# Patient Record
Sex: FEMALE | Race: WHITE | NOT HISPANIC OR LATINO | Employment: FULL TIME | URBAN - METROPOLITAN AREA
[De-identification: names, ages, dates, MRNs, and addresses within clinical notes are randomized per-mention and may not be internally consistent; named-entity substitution may affect disease eponyms.]

---

## 2023-03-25 LAB — SARS-COV-2 RESULT: NOT DETECTED

## 2023-11-21 DIAGNOSIS — Z30.41 ENCOUNTER FOR SURVEILLANCE OF CONTRACEPTIVE PILLS: ICD-10-CM

## 2024-01-02 DIAGNOSIS — Z30.41 ENCOUNTER FOR SURVEILLANCE OF CONTRACEPTIVE PILLS: Primary | ICD-10-CM

## 2024-01-02 RX ORDER — NORETHINDRONE 0.35 MG/1
1 TABLET ORAL DAILY
COMMUNITY
Start: 2023-11-21 | End: 2024-01-03 | Stop reason: WASHOUT

## 2024-01-02 RX ORDER — NORETHINDRONE 0.35 MG/1
1 TABLET ORAL DAILY
Qty: 28 TABLET | Refills: 2 | Status: CANCELLED | OUTPATIENT
Start: 2024-01-02

## 2024-01-03 DIAGNOSIS — Z30.41 ENCOUNTER FOR SURVEILLANCE OF CONTRACEPTIVE PILLS: ICD-10-CM

## 2024-01-03 RX ORDER — NORETHINDRONE 0.35 MG/1
1 TABLET ORAL DAILY
Qty: 84 TABLET | Refills: 0 | Status: SHIPPED | OUTPATIENT
Start: 2024-01-03

## 2024-01-03 RX ORDER — NORETHINDRONE 0.35 MG/1
1 TABLET ORAL DAILY
Qty: 84 TABLET | Refills: 0 | Status: SHIPPED | OUTPATIENT
Start: 2024-01-03 | End: 2024-01-03 | Stop reason: SDUPTHER

## 2024-04-22 ENCOUNTER — TELEMEDICINE (OUTPATIENT)
Dept: PRIMARY CARE | Facility: CLINIC | Age: 29
End: 2024-04-22
Payer: COMMERCIAL

## 2024-04-22 DIAGNOSIS — J01.90 ACUTE SINUSITIS, RECURRENCE NOT SPECIFIED, UNSPECIFIED LOCATION: Primary | ICD-10-CM

## 2024-04-22 PROCEDURE — 99213 OFFICE O/P EST LOW 20 MIN: CPT | Performed by: STUDENT IN AN ORGANIZED HEALTH CARE EDUCATION/TRAINING PROGRAM

## 2024-04-22 RX ORDER — AMOXICILLIN AND CLAVULANATE POTASSIUM 875; 125 MG/1; MG/1
875 TABLET, FILM COATED ORAL 2 TIMES DAILY
Qty: 20 TABLET | Refills: 0 | Status: SHIPPED | OUTPATIENT
Start: 2024-04-22 | End: 2024-05-02

## 2024-04-22 NOTE — PROGRESS NOTES
Outpatient Visit Note    No chief complaint on file.      With patient's permission, this is a Telemedicine visit with video and audio. The provider and patient participated in this telemedicine encounter.    HPI:  Pily Ely is a 28 y.o. female here with concern for sinus symptoms.    Reports that symptoms have been ongoing for over a week now.  Symptoms initially thought to be allergies, treating with antihistamine, saline nasal spray, Mucinex without improvement.  Symptoms have been progressing over the past several days.  Also associated with some cough around bedtime.  No wheezing or shortness of breath.  No fevers/chills, no associated GI symptoms.      Works as a nurse in the ED, so frequent potential exposures.  Took a home COVID test which was negative.          Current Medications  Current Outpatient Medications   Medication Instructions    norethindrone (MICRONOR) 0.35 mg, oral, Daily        Allergies  Not on File     Past Medical History:   Diagnosis Date    Headache, unspecified 11/16/2022    Headache    Personal history of other diseases of the respiratory system 11/16/2022    History of asthma      Past Surgical History:   Procedure Laterality Date    OTHER SURGICAL HISTORY  11/16/2022    [place] tooth extraction     No family history on file.     Tobacco Use: Low Risk  (10/26/2023)    Received from [facility]    Patient History     Smoking Tobacco Use: Never     Smokeless Tobacco Use: Never     Passive Exposure: Not on file        ROS  All pertinent positive symptoms are included in the history of present illness.  All other systems have been reviewed and are negative and noncontributory to this patient's current ailments.    VITAL SIGNS  There were no vitals filed for this visit.  There were no vitals filed for this visit.   There is no height or weight on file to calculate BMI.   Patient is unable to proivide    PHYSICAL EXAM  GENERAL APPEARANCE:  Alert and oriented x 3, Pleasant and  cooperative, No acute distress.   LUNGS:  No conversational dyspnea or cough during encounter.   PSYCH:  appropriate mood and affect, no difficulty with speech.   Telemedicine visit, no other exam component done.      Assessment/Plan   Problem List Items Addressed This Visit    None  Visit Diagnoses         Codes    Acute sinusitis, recurrence not specified, unspecified location    -  Primary J01.90    Relevant Medications    amoxicillin-pot clavulanate (Augmentin) 875-125 mg tablet            Additional Visit Plans:  Augmentin prescribed today for suspected bacterial sinusitis as symptoms ongoing for over a week per patient report.  No known antibiotic allergies.  Encouraged continued supportive care in addition to antibiotic use including practicing good hydration habits, saline nasal spray and/or Flonase, oral antihistamines, Mucinex.  Patient expressed understanding and was agreeable with this plan.    Counseling:   Medication education:    Education: The patient is counseled regarding potential side effects of all new medications.    Understanding:  Patient expressed understanding.    Adherence:  No barriers to adherence identified.    Shirley Larson MD

## 2024-07-18 ENCOUNTER — APPOINTMENT (OUTPATIENT)
Dept: OBSTETRICS AND GYNECOLOGY | Facility: CLINIC | Age: 29
End: 2024-07-18
Payer: COMMERCIAL

## 2024-07-18 VITALS
BODY MASS INDEX: 32.48 KG/M2 | WEIGHT: 176.5 LBS | DIASTOLIC BLOOD PRESSURE: 87 MMHG | HEIGHT: 62 IN | SYSTOLIC BLOOD PRESSURE: 131 MMHG

## 2024-07-18 DIAGNOSIS — Z01.419 WELL WOMAN EXAM WITH ROUTINE GYNECOLOGICAL EXAM: Primary | ICD-10-CM

## 2024-07-18 DIAGNOSIS — Z30.41 ENCOUNTER FOR SURVEILLANCE OF CONTRACEPTIVE PILLS: ICD-10-CM

## 2024-07-18 DIAGNOSIS — E03.8 SUBCLINICAL HYPOTHYROIDISM: ICD-10-CM

## 2024-07-18 DIAGNOSIS — N92.6 IRREGULAR BLEEDING: ICD-10-CM

## 2024-07-18 DIAGNOSIS — Z11.3 SCREENING EXAMINATION FOR STI: ICD-10-CM

## 2024-07-18 PROCEDURE — 99395 PREV VISIT EST AGE 18-39: CPT

## 2024-07-18 PROCEDURE — 99214 OFFICE O/P EST MOD 30 MIN: CPT

## 2024-07-18 PROCEDURE — 3008F BODY MASS INDEX DOCD: CPT

## 2024-07-18 PROCEDURE — 87591 N.GONORRHOEAE DNA AMP PROB: CPT

## 2024-07-18 PROCEDURE — 87491 CHLMYD TRACH DNA AMP PROBE: CPT

## 2024-07-18 RX ORDER — ESZOPICLONE 3 MG/1
3 TABLET, FILM COATED ORAL NIGHTLY
COMMUNITY
Start: 2024-03-04

## 2024-07-18 RX ORDER — DEXTROAMPHETAMINE SACCHARATE, AMPHETAMINE ASPARTATE, DEXTROAMPHETAMINE SULFATE AND AMPHETAMINE SULFATE 1.25; 1.25; 1.25; 1.25 MG/1; MG/1; MG/1; MG/1
TABLET ORAL
COMMUNITY
Start: 2024-06-26

## 2024-07-18 RX ORDER — NORETHINDRONE 0.35 MG/1
1 TABLET ORAL DAILY
Qty: 84 TABLET | Refills: 3 | Status: SHIPPED | OUTPATIENT
Start: 2024-07-18

## 2024-07-18 RX ORDER — NARATRIPTAN 2.5 MG/1
2.5 TABLET ORAL
COMMUNITY
Start: 2024-06-12

## 2024-07-18 RX ORDER — DEXTROAMPHETAMINE SACCHARATE, AMPHETAMINE ASPARTATE, DEXTROAMPHETAMINE SULFATE AND AMPHETAMINE SULFATE 7.5; 7.5; 7.5; 7.5 MG/1; MG/1; MG/1; MG/1
1 TABLET ORAL
COMMUNITY
Start: 2024-04-04

## 2024-07-18 NOTE — PROGRESS NOTES
"Subjective   Pily Ely is a 28 y.o. female who is here for a routine exam.     GYN & Sexual Hx.:   - Last pap 11/2022, normal.  - History of abnormal Pap smear: yes - Pt. states that she has a hx. of ASCUS, HPV positive pap, followed by normal colposcopy in 2018 per patient; though pap result from 2019 normal.   - Contraception: POPs  - Menstrual Periods: Periods have never been normal per patient; cycles irregular with POPs per patient.     Not currently sexually active though has been in the past.    Saw endocrinology for increased weight gain, increased TSH.   States that endocrine was thinking possibly PCOS for diagnosis?  Elevated testosterone.     HTN newly noted since October per patient.     OB Hx.:   G0    Regular self breast exam: yes  History of breast lump in 2022, normal ultrasound, Cat. 1.  Family history of breast cancer: no    Occupation:  RN in ED and pediatric forensic , Tobacco/alcohol/caffeine: no tobacco use and alcohol intake:social drinker, and Illicit drugs: no history of illicit drug use    Diet: general  Exercise: regularly as directed    Review of Systems   All other systems reviewed and are negative.      Objective   /87   Ht 1.575 m (5' 2\")   Wt 80.1 kg (176 lb 8 oz)   LMP 06/24/2024 (Exact Date)   BMI 32.28 kg/m²      General:   alert and oriented, in no acute distress           Lungs: Normal respiratory effort.   Breasts: Soft, symmetric, no skin dimpling or nipple discharge, no palpable masses or axillary nodes.   Abdomen: soft, non-tender, without masses or organomegaly   Vulva: normal, Bartholin's, Urethra, [place]'s normal   Vagina: normal mucosa, normal discharge   Cervix: no lesions           Neuro: age-appropriate affect, behavior and speech, no gross motor deficits, normal gait     Assessment      28 y.o. G0 woman for annual GYN exam.     - Health Maintenance --> Routine follow up with PCP for health maintenance examination encouraged, including TSH, " cholesterol, and Vit. D evaluation.  Self breast exam encouraged; concerning characteristics of breasts reviewed - Pt. will report general concerns, any adherent lumps, skin dimpling/puckering or color changes, and any nipple discharge.  Diet and exercise reviewed.   Pap due 2025 - Reviewed ACOG and ASCCP guidelines with pt.      - STI screening done today at patient request.     - Contraception Plan -- Continue POPs; Rx renewal provided, patient informed it is essential that the pill be taken at the same time each day to maximize contraceptive efficacy. Patient aware that if she misses a dose by more than three hours, she must use additional contraception (condoms) for 48 hours after the late dose.      - Concern for PCOS // Irregular Menses -- Discussed [place] Criteria; patient does have noted elevated testosterone; states that periods have always been irregular.  Will order for pelvic ultrasound and patient advised to schedule.  Will await result.  Discussed importance of endometrial protection if PCOS is a differential diagnosis; continue POPs, not a candidate for COCs given history of HTN.  Encouraged weight loss, consider nutrition referral.  Referral to endocrinology placed; patient states that she follows with endo at [facility] for her elevated TSH, though interested in possibly switching to .     - F/U 1 year or as needed.    Gina Wiseman, JESI-JAZMYN

## 2024-07-19 LAB
C TRACH RRNA SPEC QL NAA+PROBE: NEGATIVE
N GONORRHOEA DNA SPEC QL PROBE+SIG AMP: NEGATIVE

## 2024-08-05 ENCOUNTER — APPOINTMENT (OUTPATIENT)
Dept: RADIOLOGY | Facility: CLINIC | Age: 29
End: 2024-08-05
Payer: COMMERCIAL

## 2024-08-13 ENCOUNTER — HOSPITAL ENCOUNTER (OUTPATIENT)
Dept: RADIOLOGY | Facility: CLINIC | Age: 29
Discharge: HOME | End: 2024-08-13
Payer: COMMERCIAL

## 2024-08-13 DIAGNOSIS — N92.6 IRREGULAR BLEEDING: ICD-10-CM

## 2024-08-13 PROCEDURE — 76830 TRANSVAGINAL US NON-OB: CPT | Performed by: RADIOLOGY

## 2024-08-13 PROCEDURE — 76856 US EXAM PELVIC COMPLETE: CPT | Performed by: RADIOLOGY

## 2024-08-13 PROCEDURE — 76856 US EXAM PELVIC COMPLETE: CPT

## 2024-09-12 ENCOUNTER — APPOINTMENT (OUTPATIENT)
Dept: PRIMARY CARE | Facility: CLINIC | Age: 29
End: 2024-09-12
Payer: COMMERCIAL

## 2024-09-12 VITALS
OXYGEN SATURATION: 99 % | BODY MASS INDEX: 32.02 KG/M2 | WEIGHT: 174 LBS | TEMPERATURE: 97.1 F | HEIGHT: 62 IN | HEART RATE: 118 BPM | SYSTOLIC BLOOD PRESSURE: 122 MMHG | DIASTOLIC BLOOD PRESSURE: 78 MMHG

## 2024-09-12 DIAGNOSIS — E66.9 OBESITY, CLASS I, BMI 30-34.9: ICD-10-CM

## 2024-09-12 DIAGNOSIS — F90.2 ATTENTION DEFICIT HYPERACTIVITY DISORDER (ADHD), COMBINED TYPE: ICD-10-CM

## 2024-09-12 DIAGNOSIS — E55.9 VITAMIN D DEFICIENCY: ICD-10-CM

## 2024-09-12 DIAGNOSIS — I47.10 SVT (SUPRAVENTRICULAR TACHYCARDIA) (CMS-HCC): ICD-10-CM

## 2024-09-12 DIAGNOSIS — G43.109 MIGRAINE WITH AURA AND WITHOUT STATUS MIGRAINOSUS, NOT INTRACTABLE: ICD-10-CM

## 2024-09-12 DIAGNOSIS — J30.2 SEASONAL ALLERGIES: ICD-10-CM

## 2024-09-12 DIAGNOSIS — Z00.00 ANNUAL PHYSICAL EXAM: Primary | ICD-10-CM

## 2024-09-12 PROBLEM — F51.01 PRIMARY INSOMNIA: Status: ACTIVE | Noted: 2024-09-12

## 2024-09-12 PROBLEM — R87.810 ASCUS WITH POSITIVE HIGH RISK HPV CERVICAL: Status: ACTIVE | Noted: 2024-09-12

## 2024-09-12 PROBLEM — R87.610 ASCUS WITH POSITIVE HIGH RISK HPV CERVICAL: Status: ACTIVE | Noted: 2024-09-12

## 2024-09-12 PROBLEM — E66.811 OBESITY, CLASS I, BMI 30-34.9: Status: ACTIVE | Noted: 2019-09-18

## 2024-09-12 PROCEDURE — 1036F TOBACCO NON-USER: CPT | Performed by: NURSE PRACTITIONER

## 2024-09-12 PROCEDURE — 3008F BODY MASS INDEX DOCD: CPT | Performed by: NURSE PRACTITIONER

## 2024-09-12 PROCEDURE — 99395 PREV VISIT EST AGE 18-39: CPT | Performed by: NURSE PRACTITIONER

## 2024-09-12 RX ORDER — EPINEPHRINE 0.3 MG/.3ML
0.3 INJECTION SUBCUTANEOUS
COMMUNITY
Start: 2023-08-31

## 2024-09-12 ASSESSMENT — PATIENT HEALTH QUESTIONNAIRE - PHQ9
2. FEELING DOWN, DEPRESSED OR HOPELESS: NOT AT ALL
SUM OF ALL RESPONSES TO PHQ9 QUESTIONS 1 AND 2: 0
1. LITTLE INTEREST OR PLEASURE IN DOING THINGS: NOT AT ALL

## 2024-09-12 ASSESSMENT — PAIN SCALES - GENERAL: PAINLEVEL: 0-NO PAIN

## 2024-09-12 ASSESSMENT — ENCOUNTER SYMPTOMS
LOSS OF SENSATION IN FEET: 0
DEPRESSION: 0
OCCASIONAL FEELINGS OF UNSTEADINESS: 0

## 2024-09-12 NOTE — PATIENT INSTRUCTIONS
"Return for Fasting Labs  Nothing to eat or drink for 10 hours. Black coffee, black tea or water is ok    Night Shift Tips  * Take an hour or so to relax after work, whether it is day or nighttime. Relaxing music or a warm bath will help.    * Eat meals at the same time each day seven days a week. This schedule helps maintain the body's clock.   of your shift.    *Avoid drinking alcoholic beverages before bedtime. Although the sedative effect helps you fall asleep, it tends to wear off in 2 - 3 hours and causes disturbed sleep in the latter half of the night.    *Avoid coffee, tea, risa, all other caffeine drinks, which interfere with sleep. On a coffee break, drink orange juice (protein) and walk around. Physical activity promotes wakefulness.    *Avoid going to bed on an empty stomach. If you don't feel like eating much, try a glass of milk or dairy products, which promote sleep.    *Keep the temperature in your bedroom cool, not cold, (64 to 66 degrees)    *Wear sunglasses on your way home from work .Darken bedroom or wear comfortable eyeshades. Eyes are sensitive to light even when the lids are closed, preventing you from falling asleep or getting consolidated sleep.  Block out daytime noises, which can disturb deep restful sleep. Use comfortable sponge ear plugs or \"white noise\" electrical devices such as fans, air conditioners, or a quiet tape.    *Exercise at least every other day AFTER sleep. Daytime sleepers should avoid early morning exercise, which can promote wakefulness during the day.  Beware of certain medication. Avoid prolonged use of sleeping pills and other sedatives, which interfere with normal sleep patterns. Beware of cold and allergy medications which have sleep-related side effects. Pseudoephedrine (Sudafed) has a stimulating effect and antihistamines (Dristan) can cause drowsiness.    Insomnia  Begin a bedtime ritual. This helps to get your brain and body ready to fall asleep and performing " the same tasks let your brain know sleep is coming.   One hour before sleep: Turn off the television, computer, and cell phone. Screens stimulate the brain to awaken and TV's are not recommended in the bedroom.   Dim lights, begin calming activities such as reading, meditating, or taking a warm bath or shower. If you use melatonin or sleep medications take them now.   Listening to white noise such as a fan or white noise machine can help.  Try to go to sleep and wake up at the same times each day in order to establish a routine.  Limit alcohol and stop caffeine several hours before sleep.
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
## 2024-09-12 NOTE — ASSESSMENT & PLAN NOTE
Currently managed by her Neurologist  Medications include Adderall 30 mg in am plus Adderall 5mg every afternoon.

## 2024-09-27 ENCOUNTER — APPOINTMENT (OUTPATIENT)
Dept: PRIMARY CARE | Facility: CLINIC | Age: 29
End: 2024-09-27
Payer: COMMERCIAL

## 2024-09-30 ENCOUNTER — APPOINTMENT (OUTPATIENT)
Dept: PRIMARY CARE | Facility: CLINIC | Age: 29
End: 2024-09-30
Payer: COMMERCIAL

## 2024-10-01 ENCOUNTER — APPOINTMENT (OUTPATIENT)
Dept: PRIMARY CARE | Facility: CLINIC | Age: 29
End: 2024-10-01
Payer: COMMERCIAL

## 2024-10-28 ENCOUNTER — APPOINTMENT (OUTPATIENT)
Dept: PRIMARY CARE | Facility: CLINIC | Age: 29
End: 2024-10-28
Payer: COMMERCIAL

## 2024-10-28 ENCOUNTER — APPOINTMENT (OUTPATIENT)
Dept: LAB | Facility: LAB | Age: 29
End: 2024-10-28
Payer: COMMERCIAL

## 2024-10-28 VITALS
RESPIRATION RATE: 17 BRPM | HEART RATE: 68 BPM | DIASTOLIC BLOOD PRESSURE: 64 MMHG | HEIGHT: 62 IN | SYSTOLIC BLOOD PRESSURE: 118 MMHG | WEIGHT: 174 LBS | OXYGEN SATURATION: 100 % | BODY MASS INDEX: 32.02 KG/M2

## 2024-10-28 DIAGNOSIS — E66.811 OBESITY, CLASS I, BMI 30-34.9: Primary | ICD-10-CM

## 2024-10-28 DIAGNOSIS — N91.5 OLIGOMENORRHEA, UNSPECIFIED TYPE: ICD-10-CM

## 2024-10-28 DIAGNOSIS — F51.01 PRIMARY INSOMNIA: ICD-10-CM

## 2024-10-28 DIAGNOSIS — F90.2 ATTENTION DEFICIT HYPERACTIVITY DISORDER (ADHD), COMBINED TYPE: ICD-10-CM

## 2024-10-28 DIAGNOSIS — G43.109 MIGRAINE WITH AURA AND WITHOUT STATUS MIGRAINOSUS, NOT INTRACTABLE: ICD-10-CM

## 2024-10-28 DIAGNOSIS — I47.10 SVT (SUPRAVENTRICULAR TACHYCARDIA) (CMS-HCC): ICD-10-CM

## 2024-10-28 LAB
25(OH)D3 SERPL-MCNC: 24 NG/ML (ref 30–100)
ALBUMIN SERPL BCP-MCNC: 4.3 G/DL (ref 3.4–5)
ALP SERPL-CCNC: 74 U/L (ref 33–110)
ALT SERPL W P-5'-P-CCNC: 11 U/L (ref 7–45)
ANION GAP SERPL CALC-SCNC: 10 MMOL/L (ref 10–20)
AST SERPL W P-5'-P-CCNC: 14 U/L (ref 9–39)
BILIRUB SERPL-MCNC: 0.9 MG/DL (ref 0–1.2)
BUN SERPL-MCNC: 17 MG/DL (ref 6–23)
CALCIUM SERPL-MCNC: 9.2 MG/DL (ref 8.6–10.3)
CHLORIDE SERPL-SCNC: 105 MMOL/L (ref 98–107)
CHOLEST SERPL-MCNC: 156 MG/DL (ref 0–199)
CHOLESTEROL/HDL RATIO: 3.6
CO2 SERPL-SCNC: 29 MMOL/L (ref 21–32)
CREAT SERPL-MCNC: 0.88 MG/DL (ref 0.5–1.05)
EGFRCR SERPLBLD CKD-EPI 2021: >90 ML/MIN/1.73M*2
ERYTHROCYTE [DISTWIDTH] IN BLOOD BY AUTOMATED COUNT: 12.1 % (ref 11.5–14.5)
GLUCOSE SERPL-MCNC: 104 MG/DL (ref 74–99)
HCT VFR BLD AUTO: 41.9 % (ref 36–46)
HDLC SERPL-MCNC: 43.2 MG/DL
HGB BLD-MCNC: 13.5 G/DL (ref 12–16)
LDLC SERPL CALC-MCNC: 99 MG/DL
MCH RBC QN AUTO: 29.7 PG (ref 26–34)
MCHC RBC AUTO-ENTMCNC: 32.2 G/DL (ref 32–36)
MCV RBC AUTO: 92 FL (ref 80–100)
NON HDL CHOLESTEROL: 113 MG/DL (ref 0–149)
NRBC BLD-RTO: 0 /100 WBCS (ref 0–0)
PLATELET # BLD AUTO: 287 X10*3/UL (ref 150–450)
POTASSIUM SERPL-SCNC: 4.4 MMOL/L (ref 3.5–5.3)
PROT SERPL-MCNC: 7.2 G/DL (ref 6.4–8.2)
RBC # BLD AUTO: 4.55 X10*6/UL (ref 4–5.2)
SODIUM SERPL-SCNC: 140 MMOL/L (ref 136–145)
TRIGL SERPL-MCNC: 70 MG/DL (ref 0–149)
VLDL: 14 MG/DL (ref 0–40)
WBC # BLD AUTO: 8 X10*3/UL (ref 4.4–11.3)

## 2024-10-28 PROCEDURE — 84480 ASSAY TRIIODOTHYRONINE (T3): CPT

## 2024-10-28 PROCEDURE — 82306 VITAMIN D 25 HYDROXY: CPT

## 2024-10-28 PROCEDURE — 3008F BODY MASS INDEX DOCD: CPT | Performed by: NURSE PRACTITIONER

## 2024-10-28 PROCEDURE — 80053 COMPREHEN METABOLIC PANEL: CPT

## 2024-10-28 PROCEDURE — 84439 ASSAY OF FREE THYROXINE: CPT

## 2024-10-28 PROCEDURE — 99214 OFFICE O/P EST MOD 30 MIN: CPT | Performed by: NURSE PRACTITIONER

## 2024-10-28 PROCEDURE — 83036 HEMOGLOBIN GLYCOSYLATED A1C: CPT

## 2024-10-28 PROCEDURE — 84443 ASSAY THYROID STIM HORMONE: CPT

## 2024-10-28 PROCEDURE — 80061 LIPID PANEL: CPT

## 2024-10-28 PROCEDURE — 85027 COMPLETE CBC AUTOMATED: CPT

## 2024-10-29 LAB
EST. AVERAGE GLUCOSE BLD GHB EST-MCNC: 105 MG/DL
HBA1C MFR BLD: 5.3 %
T3 SERPL-MCNC: 124 NG/DL (ref 60–200)
T4 FREE SERPL-MCNC: 0.99 NG/DL (ref 0.61–1.12)
TSH SERPL-ACNC: 2.06 MIU/L (ref 0.44–3.98)

## 2024-10-31 ENCOUNTER — CLINICAL SUPPORT (OUTPATIENT)
Dept: PHARMACY | Facility: HOSPITAL | Age: 29
End: 2024-10-31
Payer: COMMERCIAL

## 2024-10-31 DIAGNOSIS — E66.811 OBESITY, CLASS I, BMI 30-34.9: ICD-10-CM

## 2024-10-31 DIAGNOSIS — N91.5 OLIGOMENORRHEA, UNSPECIFIED TYPE: ICD-10-CM

## 2024-11-01 DIAGNOSIS — E55.9 VITAMIN D DEFICIENCY: Primary | ICD-10-CM

## 2024-11-01 RX ORDER — CHOLECALCIFEROL (VITAMIN D3) 50 MCG
50 TABLET ORAL DAILY
Start: 2024-11-01 | End: 2025-11-01

## 2024-11-02 LAB
TESTOSTERONE FREE (CHAN): 3.4 PG/ML (ref 0.1–6.4)
TESTOSTERONE,TOTAL,LC-MS/MS: 25 NG/DL (ref 2–45)

## 2024-11-02 ASSESSMENT — ENCOUNTER SYMPTOMS
COUGH: 0
WHEEZING: 0
APPETITE CHANGE: 0
FATIGUE: 0
VOMITING: 0
CONSTIPATION: 0
SLEEP DISTURBANCE: 1
PALPITATIONS: 1
ABDOMINAL PAIN: 0
SHORTNESS OF BREATH: 0
FEVER: 0
DIARRHEA: 0
NAUSEA: 0
HEADACHES: 1

## 2024-11-04 ENCOUNTER — TELEPHONE (OUTPATIENT)
Dept: PRIMARY CARE | Facility: CLINIC | Age: 29
End: 2024-11-04
Payer: COMMERCIAL

## 2024-11-04 NOTE — TELEPHONE ENCOUNTER
Pt states one of her meds is needing to be sent to a pharmacy in [place] to be compounded.  She will need to be called, I was not able to get the name of the medicationn.

## 2024-11-07 NOTE — TELEPHONE ENCOUNTER
Spoke with patient in regards to this.   [facility] form is needing to be sent to [place], as it was sent to the [place] location.     Zepbound is not an option through [facility], as it is a compound pharmacy. Attempted to call patient with this information and confirm that she would still like this ordered, also sent a MyChart.   Once confirmation has been received, we will fax a completed order form to [facility] in [place].

## 2024-11-13 ENCOUNTER — APPOINTMENT (OUTPATIENT)
Dept: CARDIOLOGY | Facility: CLINIC | Age: 29
End: 2024-11-13
Payer: COMMERCIAL

## 2024-11-22 ENCOUNTER — TELEPHONE (OUTPATIENT)
Dept: PRIMARY CARE | Facility: CLINIC | Age: 29
End: 2024-11-22

## 2024-11-22 ENCOUNTER — LAB (OUTPATIENT)
Dept: LAB | Facility: LAB | Age: 29
End: 2024-11-22
Payer: COMMERCIAL

## 2024-11-22 DIAGNOSIS — N91.5 OLIGOMENORRHEA, UNSPECIFIED TYPE: ICD-10-CM

## 2024-11-22 DIAGNOSIS — E66.811 OBESITY, CLASS I, BMI 30-34.9: ICD-10-CM

## 2024-11-22 LAB — CORTIS AM PEAK SERPL-MSCNC: 6.3 UG/DL (ref 5–20)

## 2024-11-22 PROCEDURE — 36415 COLL VENOUS BLD VENIPUNCTURE: CPT

## 2024-11-22 PROCEDURE — 82533 TOTAL CORTISOL: CPT

## 2024-11-22 PROCEDURE — 83498 ASY HYDROXYPROGESTERONE 17-D: CPT

## 2024-11-26 ENCOUNTER — APPOINTMENT (OUTPATIENT)
Dept: ENDOCRINOLOGY | Facility: CLINIC | Age: 29
End: 2024-11-26
Payer: COMMERCIAL

## 2024-11-26 ENCOUNTER — TELEPHONE (OUTPATIENT)
Dept: PRIMARY CARE | Facility: CLINIC | Age: 29
End: 2024-11-26

## 2024-11-26 VITALS
WEIGHT: 173 LBS | BODY MASS INDEX: 31.83 KG/M2 | RESPIRATION RATE: 16 BRPM | SYSTOLIC BLOOD PRESSURE: 110 MMHG | HEIGHT: 62 IN | DIASTOLIC BLOOD PRESSURE: 70 MMHG | HEART RATE: 86 BPM

## 2024-11-26 DIAGNOSIS — E03.8 SUBCLINICAL HYPOTHYROIDISM: ICD-10-CM

## 2024-11-26 DIAGNOSIS — E28.2 PCOS (POLYCYSTIC OVARIAN SYNDROME): Primary | ICD-10-CM

## 2024-11-26 PROCEDURE — 99204 OFFICE O/P NEW MOD 45 MIN: CPT | Performed by: INTERNAL MEDICINE

## 2024-11-26 PROCEDURE — 1036F TOBACCO NON-USER: CPT | Performed by: INTERNAL MEDICINE

## 2024-11-26 PROCEDURE — 3008F BODY MASS INDEX DOCD: CPT | Performed by: INTERNAL MEDICINE

## 2024-11-26 ASSESSMENT — ENCOUNTER SYMPTOMS
DIARRHEA: 0
NAUSEA: 0
SHORTNESS OF BREATH: 0
CHILLS: 0
PALPITATIONS: 0
FEVER: 0
HEADACHES: 0
COUGH: 0
VOMITING: 0
FATIGUE: 0

## 2024-11-26 NOTE — PATIENT INSTRUCTIONS
Continue to work on low calorie high-protein low-carb diet and exercise  Look into medication options to assist with weight loss such as metformin qsymia  Follow-up in 6 months  Please call or message with any concerns or questions

## 2024-11-26 NOTE — PROGRESS NOTES
Endocrinology: Follow up visit  Subjective   Patient ID: Pily Ely is a 29 y.o. female who presents for Polycystic Ovary Syndrome, Weight Gain, and Thyroid Problem. Patient is as self referral.     PCP: JESI Mccoy-CNP    HPI  29-year-old self-referred for evaluation of PCOS also with difficulty losing weight and abnormal thyroid blood work.  She was seen at [facility] about a year ago and had a full evaluation for irregular menstrual cycles that showed a mildly elevated testosterone.  All blood work beyond that was normal.  Of note she had had a TSH a few months prior to that and it was mildly elevated at 5.8 but on repeat in October 2023 was normal at 3.  More recently she had a TSH of 2 and a normal testosterone values.  She has had irregular menstrual cycles since she started.  She is currently on birth control pills which she has been taking for several years.  Prior to starting them she had skipped months at the time.  She has very mild hirsutism on her upper lip that she shaves every other week.  She gained about 30 pounds 2 years ago and is very frustrated that she has been unable to lose it.  She has been trying to work on her eating habits and focusing on a protein-based diet.  She has been going to the gym several days per week.  She had discussed weight loss injectables with her primary care and there was some miscommunication of what was available and the pharmacy program that they send it into.  She did look into  pharmacy coverage but her insurance does not currently cover weekly injectables for weight loss.  She denies any history of pancreatitis.  She does have a family history of type 2 diabetes in her maternal grandfather as well as insulin resistance and gestational diabetes in her mother.  Her mother also has hypothyroidism  Review of Systems   Constitutional:  Negative for chills, fatigue and fever.   Respiratory:  Negative for cough and shortness of breath.     Cardiovascular:  Negative for chest pain and palpitations.   Gastrointestinal:  Negative for diarrhea, nausea and vomiting.   Neurological:  Negative for headaches.       Patient Active Problem List   Diagnosis    ASCUS with positive high risk HPV cervical    Attention deficit hyperactivity disorder (ADHD)    Migraine with aura    Vitamin D deficiency    Seasonal allergies    Obesity, Class I, BMI 30-34.9    Annual physical exam    SVT (supraventricular tachycardia) (CMS-[facility])    Primary insomnia        Home Meds:  Current Outpatient Medications   Medication Instructions    amphetamine-dextroamphetamine (Adderall) 30 mg tablet 1 tablet, Daily before breakfast    amphetamine-dextroamphetamine (Adderall) 5 mg tablet take 1 tablet by mouth IN THE AFTERNOON    cholecalciferol (VITAMIN D-3) 50 mcg, oral, Daily    EPINEPHrine (EPIPEN) 0.3 mg    eszopiclone (LUNESTA) 3 mg, Nightly    naratriptan (AMERGE) 2.5 mg, Once as needed    norethindrone (MICRONOR) 0.35 mg, oral, Daily    tirzepatide (weight loss) (ZEPBOUND) 2.5 mg, subcutaneous, Every 7 days        Allergies   Allergen Reactions    Cinnamon Anaphylaxis and Hives    Pertussis Vaccines Rash     fever        Objective   Vitals:    11/26/24 1540   BP: 110/70   Pulse: 86   Resp: 16      Vitals:    11/26/24 1540   Weight: 78.5 kg (173 lb)      Body mass index is 31.64 kg/m².   Physical Exam  Constitutional:       Appearance: Normal appearance. She is overweight.   HENT:      Head: Normocephalic and atraumatic.   Neck:      Thyroid: No thyroid mass, thyromegaly or thyroid tenderness.   Cardiovascular:      Rate and Rhythm: Normal rate and regular rhythm.      Heart sounds: No murmur heard.     No gallop.   Pulmonary:      Effort: Pulmonary effort is normal.      Breath sounds: Normal breath sounds.   Abdominal:      Palpations: Abdomen is soft.      Comments: benign   Neurological:      General: No focal deficit present.      Mental Status: She is alert and oriented to  "person, place, and time.      Deep Tendon Reflexes: Reflexes are normal and symmetric.   Psychiatric:         Behavior: Behavior is cooperative.         Labs:  Lab Results   Component Value Date    HGBA1C 5.3 10/28/2024    TSH 2.06 10/28/2024    FREET4 0.99 10/28/2024      No results found for: \"PR1\", \"THYROIDPAB\", \"TSI\"     Assessment/Plan   Assessment & Plan  Subclinical hypothyroidism    Orders:    Referral to Endocrinology    PCOS (polycystic ovarian syndrome)    29-year-old here for evaluation of abnormal thyroid blood work also with PCOS.  We discussed hypothyroidism in detail and her thyroid levels are currently normal.  Given her past history and family history of thyroid disorders I would recommend yearly screening and she is certainly at risk for developing thyroid dysfunction at some point in her life.  We did review PCOS being a diagnosis of exclusion.  She has had an elevated testosterone in the past and has had irregular menstrual cycles so she would meet current criteria.  We did discuss following a low-carb diet.  She has very minor hirsutism so spironolactone would not be required at this point.  We did discuss weight management issues and discussed metformin and weekly injectables.  We did discuss her current insurance does not cover weekly injectables.  We did discuss compounded pharmacies and the medication she currently has on hand which is compounded Wegovy.  She is reluctant to use the compounded medication.  We reviewed metformin and Qsymia and she will look into both options.  Otherwise I encouraged her to continue to work on a low-carb plan and I will see her back in 6 months      Electronically signed by:  Carmen Dunn MD 11/26/24 3:40 PM              "

## 2024-11-27 LAB — 17OHP SERPL-MCNC: 15.78 NG/DL

## 2025-01-27 ENCOUNTER — APPOINTMENT (OUTPATIENT)
Dept: PRIMARY CARE | Facility: CLINIC | Age: 30
End: 2025-01-27
Payer: COMMERCIAL

## 2025-02-18 DIAGNOSIS — E66.811 OBESITY, CLASS I, BMI 30-34.9: ICD-10-CM

## 2025-03-03 ENCOUNTER — PATIENT OUTREACH (OUTPATIENT)
Dept: CARE COORDINATION | Facility: CLINIC | Age: 30
End: 2025-03-03
Payer: COMMERCIAL

## 2025-03-03 NOTE — PROGRESS NOTES
This RD attempted to reach pt re: nutrition referral without success. LVM with this RD's info. Pt encouraged to call this RD back at [phone] if she wishes to work with RD. Will attempt to reach pt again at later date.

## 2025-03-06 ENCOUNTER — PATIENT OUTREACH (OUTPATIENT)
Dept: CARE COORDINATION | Facility: CLINIC | Age: 30
End: 2025-03-06
Payer: COMMERCIAL

## 2025-03-06 ENCOUNTER — PATIENT MESSAGE (OUTPATIENT)
Dept: CARE COORDINATION | Facility: CLINIC | Age: 30
End: 2025-03-06
Payer: COMMERCIAL

## 2025-03-12 ENCOUNTER — APPOINTMENT (OUTPATIENT)
Dept: PRIMARY CARE | Facility: CLINIC | Age: 30
End: 2025-03-12
Payer: COMMERCIAL

## 2025-03-17 DIAGNOSIS — E28.2 PCOS (POLYCYSTIC OVARIAN SYNDROME): Primary | ICD-10-CM

## 2025-03-17 DIAGNOSIS — E66.811 OBESITY, CLASS I, BMI 30-34.9: ICD-10-CM

## 2025-04-14 DIAGNOSIS — E66.811 OBESITY, CLASS I, BMI 30-34.9: Primary | ICD-10-CM

## 2025-05-06 ENCOUNTER — PATIENT MESSAGE (OUTPATIENT)
Dept: OBSTETRICS AND GYNECOLOGY | Facility: CLINIC | Age: 30
End: 2025-05-06
Payer: COMMERCIAL

## 2025-05-06 DIAGNOSIS — Z30.41 ENCOUNTER FOR SURVEILLANCE OF CONTRACEPTIVE PILLS: ICD-10-CM

## 2025-05-07 RX ORDER — NORETHINDRONE 0.35 MG/1
1 TABLET ORAL DAILY
Qty: 84 TABLET | Refills: 0 | Status: SHIPPED | OUTPATIENT
Start: 2025-05-07

## 2025-05-07 NOTE — PATIENT COMMUNICATION
Requested Prescriptions     Pending Prescriptions Disp Refills    norethindrone (Micronor) 0.35 mg tablet 84 tablet 0     Sig: Take 1 tablet (0.35 mg) by mouth once daily.     LOV 7/18/2024  NOV will call to schedule  Last pap  11/16/2022      Brittney Hale,  MARLENA III

## 2025-05-19 DIAGNOSIS — E66.09 CLASS 1 OBESITY DUE TO EXCESS CALORIES WITHOUT SERIOUS COMORBIDITY WITH BODY MASS INDEX (BMI) OF 31.0 TO 31.9 IN ADULT: Primary | ICD-10-CM

## 2025-05-19 DIAGNOSIS — E66.811 CLASS 1 OBESITY DUE TO EXCESS CALORIES WITHOUT SERIOUS COMORBIDITY WITH BODY MASS INDEX (BMI) OF 31.0 TO 31.9 IN ADULT: Primary | ICD-10-CM

## 2025-05-27 ENCOUNTER — APPOINTMENT (OUTPATIENT)
Dept: ENDOCRINOLOGY | Facility: CLINIC | Age: 30
End: 2025-05-27
Payer: COMMERCIAL

## 2025-06-13 DIAGNOSIS — E28.2 PCOS (POLYCYSTIC OVARIAN SYNDROME): Primary | ICD-10-CM

## 2025-07-29 DIAGNOSIS — Z30.41 ENCOUNTER FOR SURVEILLANCE OF CONTRACEPTIVE PILLS: ICD-10-CM

## 2025-07-29 NOTE — TELEPHONE ENCOUNTER
Requested Prescriptions     Pending Prescriptions Disp Refills    norethindrone (Micronor) 0.35 mg tablet [Pharmacy Med Name: NORETHINDRONE 0.35MG TABLETS 28S] 84 tablet 0     Sig: TAKE 1 TABLET BY MOUTH ONCE DAILY     LOV 7/18/2024  NOV 8/7/2025    This will be last refill until she is seen in the office for her Annual.    Brittney Hale,  [facility]

## 2025-07-30 RX ORDER — NORETHINDRONE 0.35 MG/1
1 TABLET ORAL
Qty: 84 TABLET | Refills: 0 | Status: SHIPPED | OUTPATIENT
Start: 2025-07-30

## 2025-08-07 ENCOUNTER — APPOINTMENT (OUTPATIENT)
Dept: OBSTETRICS AND GYNECOLOGY | Facility: CLINIC | Age: 30
End: 2025-08-07
Payer: COMMERCIAL

## 2025-08-07 VITALS
DIASTOLIC BLOOD PRESSURE: 79 MMHG | BODY MASS INDEX: 22.86 KG/M2 | HEART RATE: 74 BPM | WEIGHT: 125 LBS | SYSTOLIC BLOOD PRESSURE: 111 MMHG

## 2025-08-07 DIAGNOSIS — Z30.41 ENCOUNTER FOR SURVEILLANCE OF CONTRACEPTIVE PILLS: ICD-10-CM

## 2025-08-07 DIAGNOSIS — Z01.419 WELL WOMAN EXAM WITH ROUTINE GYNECOLOGICAL EXAM: Primary | ICD-10-CM

## 2025-08-07 DIAGNOSIS — Z11.3 SCREENING EXAMINATION FOR STI: ICD-10-CM

## 2025-08-07 PROCEDURE — 99395 PREV VISIT EST AGE 18-39: CPT

## 2025-08-07 PROCEDURE — 87591 N.GONORRHOEAE DNA AMP PROB: CPT

## 2025-08-07 PROCEDURE — 1036F TOBACCO NON-USER: CPT

## 2025-08-07 PROCEDURE — 87661 TRICHOMONAS VAGINALIS AMPLIF: CPT

## 2025-08-07 PROCEDURE — 87491 CHLMYD TRACH DNA AMP PROBE: CPT

## 2025-08-07 RX ORDER — NORETHINDRONE 0.35 MG/1
1 TABLET ORAL
Qty: 90 TABLET | Refills: 3 | Status: SHIPPED | OUTPATIENT
Start: 2025-08-07 | End: 2026-08-02

## 2025-08-07 NOTE — PROGRESS NOTES
Subjective   Pily Ely is a 29 y.o. female who is here for a routine exam.     GYN & Sexual Hx.:   - Last pap 11/2022, normal.   - History of abnormal Pap smear: yes - Pt. states that she has a hx. of ASCUS, HPV positive pap, followed by normal colposcopy in 2018 per patient; though pap result from 2019 normal.   - Contraception: POPs.   - Menstrual Periods: Irregular with POPs.    Follows endocrinology for PCOS, thyroid.   Changed diet/exercise regimen; taking weight loss medication.  Feeling better physically and mentally.     OB Hx.:   G0    Regular self breast exam: yes  History of breast lump in 2022, normal US, Cat. 1.  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      Objective   /79   Pulse 74   Wt 56.7 kg (125 lb)   LMP 03/04/2025   BMI 22.86 kg/m²      General:   alert and oriented, in no acute distress           Lungs: Normal respiratory effort.    Breasts: Soft, symmetric, no skin dimpling or nipple discharge, no palpable masses or axillary nodes.   Abdomen: soft, non-tender, without masses or organomegaly   Vulva: normal, Bartholin's, Urethra, [place]'s normal   Vagina: normal mucosa, normal discharge   Cervix: no lesions           Neuro: age-appropriate affect, behavior and speech, no gross motor deficits, normal gait     Assessment      29 y.o. G0 woman for annual GYN exam.     - Health Maintenance --> Routine follow up with PCP for health maintenance examination encouraged, including TSH, cholesterol, and Vit. D evaluation.  Self breast exam encouraged; concerning characteristics of breasts reviewed - Pt. will report general concerns, any adherent lumps, skin dimpling/puckering or color changes, and any nipple  discharge.  Diet and exercise reviewed.   Pap done today - Reviewed ACOG and ASCCP guidelines with pt. If normal, will repeat in 3 years.     - STI Screening -- Done today at patient request with pap.     - Contraception Plan -- Continue POPs; Rx renewal sent to pharmacy.  Continue taking at same time daily.      - F/U 1 year or as needed.    Gina Wiseman, APRN-CARMELM

## 2025-08-08 LAB
C TRACH RRNA SPEC QL NAA+PROBE: NEGATIVE
N GONORRHOEA DNA SPEC QL PROBE+SIG AMP: NEGATIVE
T VAGINALIS RRNA SPEC QL NAA+PROBE: NEGATIVE

## 2025-08-09 ENCOUNTER — RESULTS FOLLOW-UP (OUTPATIENT)
Dept: OBSTETRICS AND GYNECOLOGY | Facility: HOSPITAL | Age: 30
End: 2025-08-09
Payer: COMMERCIAL

## 2025-08-19 ENCOUNTER — APPOINTMENT (OUTPATIENT)
Facility: CLINIC | Age: 30
End: 2025-08-19
Payer: COMMERCIAL

## 2025-09-02 LAB
CYTOLOGY CMNT CVX/VAG CYTO-IMP: NORMAL
HPV HR 12 DNA GENITAL QL NAA+PROBE: NEGATIVE
HPV HR GENOTYPES PNL CVX NAA+PROBE: NEGATIVE
HPV16 DNA SPEC QL NAA+PROBE: NEGATIVE
HPV18 DNA SPEC QL NAA+PROBE: NEGATIVE
LAB AP HPV GENOTYPE QUESTION: YES
LAB AP HPV HR: NORMAL
LAB AP PAP ADDITIONAL TESTS: NORMAL
LABORATORY COMMENT REPORT: NORMAL
LMP START DATE: NORMAL
PATH REPORT.TOTAL CANCER: NORMAL

## 2025-09-07 DIAGNOSIS — E28.2 PCOS (POLYCYSTIC OVARIAN SYNDROME): ICD-10-CM

## 2025-09-07 RX ORDER — TIRZEPATIDE 15 MG/.5ML
INJECTION, SOLUTION SUBCUTANEOUS
Qty: 2 ML | Refills: 3 | Status: SHIPPED | OUTPATIENT
Start: 2025-09-07

## 2025-10-30 ENCOUNTER — APPOINTMENT (OUTPATIENT)
Facility: CLINIC | Age: 30
End: 2025-10-30
Payer: COMMERCIAL